# Patient Record
Sex: FEMALE | Race: WHITE
[De-identification: names, ages, dates, MRNs, and addresses within clinical notes are randomized per-mention and may not be internally consistent; named-entity substitution may affect disease eponyms.]

---
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
## 2020-07-29 ENCOUNTER — HOSPITAL ENCOUNTER (EMERGENCY)
Dept: HOSPITAL 61 - ER | Age: 20
Discharge: LEFT BEFORE DISCHARGE | End: 2020-07-29
Payer: COMMERCIAL

## 2020-07-29 DIAGNOSIS — N64.4: Primary | ICD-10-CM

## 2020-07-29 DIAGNOSIS — Z53.21: ICD-10-CM

## 2020-09-19 ENCOUNTER — HOSPITAL ENCOUNTER (EMERGENCY)
Dept: HOSPITAL 61 - ER | Age: 20
Discharge: HOME | End: 2020-09-19
Payer: SELF-PAY

## 2020-09-19 VITALS — WEIGHT: 145.51 LBS | BODY MASS INDEX: 28.57 KG/M2 | HEIGHT: 60 IN

## 2020-09-19 VITALS — SYSTOLIC BLOOD PRESSURE: 136 MMHG | DIASTOLIC BLOOD PRESSURE: 67 MMHG

## 2020-09-19 DIAGNOSIS — F32.9: ICD-10-CM

## 2020-09-19 DIAGNOSIS — Z88.2: ICD-10-CM

## 2020-09-19 DIAGNOSIS — Z88.1: ICD-10-CM

## 2020-09-19 DIAGNOSIS — F12.90: ICD-10-CM

## 2020-09-19 DIAGNOSIS — B96.89: ICD-10-CM

## 2020-09-19 DIAGNOSIS — Z98.890: ICD-10-CM

## 2020-09-19 DIAGNOSIS — F19.90: ICD-10-CM

## 2020-09-19 DIAGNOSIS — Z88.8: ICD-10-CM

## 2020-09-19 DIAGNOSIS — Z91.041: ICD-10-CM

## 2020-09-19 DIAGNOSIS — Z88.0: ICD-10-CM

## 2020-09-19 DIAGNOSIS — F41.9: ICD-10-CM

## 2020-09-19 DIAGNOSIS — F17.200: ICD-10-CM

## 2020-09-19 DIAGNOSIS — N76.0: Primary | ICD-10-CM

## 2020-09-19 PROCEDURE — 87591 N.GONORRHOEAE DNA AMP PROB: CPT

## 2020-09-19 PROCEDURE — 99284 EMERGENCY DEPT VISIT MOD MDM: CPT

## 2020-09-19 PROCEDURE — 87491 CHLMYD TRACH DNA AMP PROBE: CPT

## 2020-09-19 PROCEDURE — 96372 THER/PROPH/DIAG INJ SC/IM: CPT

## 2020-09-19 NOTE — PHYS DOC
Past Medical History


Past Medical History:  Anxiety, Depression, Other


Additional Past Medical Histor:  "Hard to concentrate." ADHD, PTSD, manic 

depression


Past Surgical History:  Other


Additional Past Surgical Histo:  abscess


Smoking Status:  Current Every Day Smoker


Alcohol Use:  None


Drug Use:  Marijuana, Methamphetamine





General Adult


EDM:


Chief Complaint:  SEXUALLY TRANSMITTED DISEASE





HPI:


HPI:





Patient is a 20  year old female who presents to the emergency department for 

testing for sexually transmitted infections.  The patient denies any irregular 

vaginal discharge, odor, or bleeding.  She reports that her boyfriend informed 

her about abnormal penile discharge and dysuria that he was experiencing this 

week.  Patient denies any fever, abdominal pain, back pain, nausea, vomiting, 

diarrhea, dysuria, hematuria, or increased urinary frequency.  She currently 

denies any pain.





Review of Systems:


Review of Systems:


Constitutional:   Denies fever or chills. []


GI:   Denies abdominal pain, nausea, vomiting, or diarrhea. [] 


: See HPI


Integument:   Denies rash. [] 


Complete ROS is negative unless otherwise stated in the HPI.





Heart Score:


Risk Factors:


Risk Factors:  DM, Current or recent (<one month) smoker, HTN, HLP, family 

history of CAD, obesity.


Risk Scores:


Score 0 - 3:  2.5% MACE over next 6 weeks - Discharge Home


Score 4 - 6:  20.3% MACE over next 6 weeks - Admit for Clinical Observation


Score 7 - 10:  72.7% MACE over next 6 weeks - Early Invasive Strategies





Current Medications:





Current Medications








 Medications


  (Trade)  Dose


 Ordered  Sig/[facility]  Start Time


 Stop Time Status Last Admin


Dose Admin


 


 Azithromycin


  (Zithromax)  1,000 mg  1X  ONCE  9/19/20 21:15


 9/19/20 21:16 DC 9/19/20 21:30


1,000 MG


 


 Ceftriaxone Sodium


  (Rocephin Im)  250 mg  1X  ONCE  9/19/20 21:15


 9/19/20 21:16 DC 9/19/20 21:30


250 MG











Allergies:


Allergies:





Allergies








Coded Allergies Type Severity Reaction Last Updated Verified


 


  Iodinated Contrast Media Allergy Intermediate Hives 9/15/16 Yes


 


  Penicillins Allergy Intermediate Vomiting,Hives 9/15/16 Yes


 


  amoxicillin Allergy Intermediate Nausea and Vomiting,Hives 9/15/16 Yes


 


  sulfamethoxazole Allergy Intermediate Vomiting,Hives. 9/15/16 Yes


 


  trimethoprim Allergy Intermediate Vomiting,Hives. 9/15/16 Yes











Physical Exam:


PE:





Constitutional: Well developed, well nourished, no acute distress, non-toxic 

appearance. 


HENT: Normocephalic, atraumatic, bilateral external ears normal, nose normal. 


Eyes: PERRLA, EOMI, conjunctiva normal, no discharge.


Neck: Normal range of motion, no stridor.


Cardiovascular: Heart rate regular rhythm


Lungs & Thorax:  Respirations even and unlabored, no retractions, no respiratory

 distress


Pelvic Exam: Chaperone present


 Abdomen:      Nontender, soft


 External Genitalia:   Normal Skin


 Speculum:      Normal vaginal mucosa, pale yellow cervical discharge, 

nonfriable


 Bimanual:       No adnexal masses or tenderness, No CMT


Skin: Warm, dry, no erythema, no rash. 


Extremities: No cyanosis, ROM intact, no edema. 


Neurologic: Alert and oriented X 3, no focal deficits noted. 


Psychologic: Affect normal, judgement normal, mood normal.





Current Patient Data:


Labs:





Microbiology


9/19/20 Wet Prep - Final, Complete


Vital Signs:





                                   Vital Signs








  Date Time  Temp Pulse Resp B/P (MAP) Pulse Ox O2 Delivery O2 Flow Rate FiO2


 


9/19/20 20:45 98.7 113 15 136/67 (90) 99 Room Air  





 98.7       











EKG:


EKG:


[]





Radiology/Procedures:


Radiology/Procedures:


[]





Course & Med Decision Making:


Course & Med Decision Making


Pertinent Labs and Imaging studies reviewed. (See chart for details)


Patient was treated prophylactically with 250 mg of IM Rocephin, and 1 g of PO 

Zithromax. Patient was instructed to avoid having intercourse until the results 

of gonorrhea and chlamydia testing are available, patient was notified that 

these results would not be available for 48 hours. If one or both of these tests

 is positive, patient needs to refrain from intercourse for approximately 1 week

 following the treatment of any current partners.


Wet mount was concerning for bacterial vaginosis, prescription written for 

Flagyl.





Patient verbalized an understanding of home care, medications, follow-up, and 

return to ED instructions and was in agreement with the plan of care.





[]





Dragon Disclaimer:


Dragon Disclaimer:


This electronic medical record was generated, in whole or in part, using a voice

 recognition dictation system.





Departure


Departure


Impression:  


   Primary Impression:  


   Bacterial vaginosis


   Additional Impression:  


   Contact with and (suspected) exposure to infections with a predominantly 

   sexual mode of transmission


Disposition:  01 HOME, SELF-CARE


Condition:  STABLE


Referrals:  


STAN GONZALEZ (PCP)


Patient Instructions:  Bacterial Vaginosis, Easy-to-Read, Sexually Transmitted 

Disease, Easy-to-Read





Additional Instructions:  


Fill the prescription and use as directed. Recommend that you go to your local 

health department for comprehensive sexually transmitted disease testing. You 

have been treated for a suspected gonorrhea and chlamydia. Avoid having 

intercourse until the results of gonorrhea and chlamydia testing are available, 

these results will not be available for 48 hours. If one or both of these tests 

is positive, you need to refrain from intercourse for approximately 1 week 

following the treatment of any current partners. Follow-up with your primary 

care doctor if symptoms persist, return to ER symptoms worsen.


Scripts


Metronidazole (FLAGYL) 500 Mg Tablet


1 TAB PO BID, #14 TAB 0 Refills


   Prov: SAHRA SHARIF         9/19/20





Justicifation of Admission Dx:


Justifications for Admission:


Justification of Admission Dx:  N/A











SAHRA SHARIF       Sep 19, 2020 21:45

## 2020-11-03 ENCOUNTER — HOSPITAL ENCOUNTER (EMERGENCY)
Dept: HOSPITAL 61 - ER | Age: 20
Discharge: HOME | End: 2020-11-03
Payer: SELF-PAY

## 2020-11-03 VITALS — WEIGHT: 145.28 LBS | BODY MASS INDEX: 28.52 KG/M2 | HEIGHT: 60 IN

## 2020-11-03 VITALS — DIASTOLIC BLOOD PRESSURE: 67 MMHG | SYSTOLIC BLOOD PRESSURE: 118 MMHG

## 2020-11-03 DIAGNOSIS — F90.9: ICD-10-CM

## 2020-11-03 DIAGNOSIS — F32.9: Primary | ICD-10-CM

## 2020-11-03 DIAGNOSIS — Z88.1: ICD-10-CM

## 2020-11-03 DIAGNOSIS — F41.9: ICD-10-CM

## 2020-11-03 DIAGNOSIS — F17.200: ICD-10-CM

## 2020-11-03 DIAGNOSIS — Z88.0: ICD-10-CM

## 2020-11-03 DIAGNOSIS — R20.2: ICD-10-CM

## 2020-11-03 DIAGNOSIS — F19.90: ICD-10-CM

## 2020-11-03 DIAGNOSIS — F43.10: ICD-10-CM

## 2020-11-03 DIAGNOSIS — F12.90: ICD-10-CM

## 2020-11-03 DIAGNOSIS — T40.7X1A: ICD-10-CM

## 2020-11-03 DIAGNOSIS — Z88.8: ICD-10-CM

## 2020-11-03 DIAGNOSIS — Z91.041: ICD-10-CM

## 2020-11-03 DIAGNOSIS — Z88.2: ICD-10-CM
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
## 2020-11-03 DIAGNOSIS — Z98.890: ICD-10-CM

## 2020-11-03 LAB
ACETAMIN: < 2 MCG/ML (ref 10–30)
AMPHETAMINE/METHAMPHETAMINE: (no result)
ANION GAP SERPL CALC-SCNC: 9 MMOL/L (ref 6–14)
BARBITURATES UR-MCNC: (no result) UG/ML
BASOPHILS # BLD AUTO: 0 X10^3/UL (ref 0–0.2)
BASOPHILS NFR BLD: 1 % (ref 0–3)
BENZODIAZ UR-MCNC: (no result) UG/L
BUN SERPL-MCNC: 11 MG/DL (ref 7–20)
CALCIUM SERPL-MCNC: 8.9 MG/DL (ref 8.5–10.1)
CANNABINOIDS UR-MCNC: (no result) UG/L
CHLORIDE SERPL-SCNC: 101 MMOL/L (ref 98–107)
CO2 SERPL-SCNC: 28 MMOL/L (ref 21–32)
COCAINE UR-MCNC: (no result) NG/ML
CREAT SERPL-MCNC: 0.8 MG/DL (ref 0.6–1)
EOSINOPHIL NFR BLD: 0.1 X10^3/UL (ref 0–0.7)
EOSINOPHIL NFR BLD: 1 % (ref 0–3)
ERYTHROCYTE [DISTWIDTH] IN BLOOD BY AUTOMATED COUNT: 16.1 % (ref 11.5–14.5)
ETHANOL SERPL-MCNC: < 10 MG/DL (ref 0–10)
GFR SERPLBLD BASED ON 1.73 SQ M-ARVRAT: 91.4 ML/MIN
GLUCOSE SERPL-MCNC: 120 MG/DL (ref 70–99)
HCT VFR BLD CALC: 40.8 % (ref 36–47)
HGB BLD-MCNC: 13.4 G/DL (ref 12–15.5)
LYMPHOCYTES # BLD: 3.8 X10^3/UL (ref 1–4.8)
LYMPHOCYTES NFR BLD AUTO: 51 % (ref 24–48)
MCH RBC QN AUTO: 27 PG (ref 25–35)
MCHC RBC AUTO-ENTMCNC: 33 G/DL (ref 31–37)
MCV RBC AUTO: 81 FL (ref 79–100)
METHADONE SERPL-MCNC: (no result) NG/ML
MONO #: 0.8 X10^3/UL (ref 0–1.1)
MONOCYTES NFR BLD: 11 % (ref 0–9)
NEUT #: 2.7 X10^3/UL (ref 1.8–7.7)
NEUTROPHILS NFR BLD AUTO: 36 % (ref 31–73)
OPIATES UR-MCNC: (no result) NG/ML
PCP SERPL-MCNC: (no result) MG/DL
PLATELET # BLD AUTO: 236 X10^3/UL (ref 140–400)
POTASSIUM SERPL-SCNC: 3.8 MMOL/L (ref 3.5–5.1)
RBC # BLD AUTO: 5.02 X10^6/UL (ref 3.5–5.4)
SALIC: < 2.8 MG/DL (ref 2.8–20)
SODIUM SERPL-SCNC: 138 MMOL/L (ref 136–145)
WBC # BLD AUTO: 7.4 X10^3/UL (ref 4–11)

## 2020-11-03 PROCEDURE — 96361 HYDRATE IV INFUSION ADD-ON: CPT

## 2020-11-03 PROCEDURE — 81025 URINE PREGNANCY TEST: CPT

## 2020-11-03 PROCEDURE — 80307 DRUG TEST PRSMV CHEM ANLYZR: CPT

## 2020-11-03 PROCEDURE — 36415 COLL VENOUS BLD VENIPUNCTURE: CPT

## 2020-11-03 PROCEDURE — 80329 ANALGESICS NON-OPIOID 1 OR 2: CPT

## 2020-11-03 PROCEDURE — 85025 COMPLETE CBC W/AUTO DIFF WBC: CPT

## 2020-11-03 PROCEDURE — 93005 ELECTROCARDIOGRAM TRACING: CPT

## 2020-11-03 PROCEDURE — 99285 EMERGENCY DEPT VISIT HI MDM: CPT

## 2020-11-03 PROCEDURE — 96376 TX/PRO/DX INJ SAME DRUG ADON: CPT

## 2020-11-03 PROCEDURE — 96374 THER/PROPH/DIAG INJ IV PUSH: CPT

## 2020-11-03 PROCEDURE — 80048 BASIC METABOLIC PNL TOTAL CA: CPT

## 2020-11-03 PROCEDURE — G0480 DRUG TEST DEF 1-7 CLASSES: HCPCS

## 2020-11-03 NOTE — EKG
[facility]

              [address] [place] [address]

Test Date:    2020               Test Time:    12:59:22

Pat Name:     AL PETERSEN             Department:   

Patient ID:   [id number]           Room:          

Gender:       F                        Technician:   

:          2000               Requested By: HUY KIM

Order Number: [id number]           Reading MD:   Christopher Vargas

                                 Measurements

Intervals                              Axis          

Rate:         85                       P:            129

OH:           176                      QRS:          141

QRSD:         88                       T:            146

QT:           354                                    

QTc:          427                                    

                           Interpretive Statements

SINUS RHYTHM

QRS(T) CONTOUR ABNORMALITY

CONSISTENT WITH HIGH LATERAL MYOCARDIAL DAMAGE

ABNORMAL ECG

RI6.02

No previous ECG available for comparison

Electronically Signed On 2020 9:21:48 CST by Christopher Vargas

## 2020-11-03 NOTE — PHYS DOC
Past Medical History


Past Medical History:  Anxiety, Depression, Other


Additional Past Medical Histor:  "Hard to concentrate." ADHD, PTSD, manic 

depression


Past Surgical History:  Other


Additional Past Surgical Histo:  abscess


Smoking Status:  Current Every Day Smoker


Alcohol Use:  None


Drug Use:  Marijuana, Methamphetamine





General Adult


HPI:


HPI:





History obtained from patient and EMS.  Patient is a 20 old female with a 

history of anxiety who presents with chief complaint of overdose.  EMS states 

they were called to the patient's house.  Patient states she smoked marijuana 

earlier today.  She states that she snorted fentanyl just prior to arrival.  

States it was a first time she used it.  She denies any suicidal homicidal 

ideations.  Denies any other drug or alcohol ingestions today.  Denies any pain 

related complaints.  States she does not want her mother to know about her 

usage.  States she was uses recreationally for the first time.  Denies abdominal

pain or chest pain.  Denies any headache.  Denies syncope. No other complaints.





GCS 15.





Review of Systems:


Review of Systems:


Constitutional:   Denies fever or chills. []


Eyes:   Denies change in visual acuity. []


HENT:   Denies nasal congestion or sore throat. [] 


Respiratory:   Denies cough or shortness of breath. [] 


Cardiovascular:   Denies chest pain or edema. [] 


GI:   Denies abdominal pain, nausea, vomiting, bloody stools or diarrhea. [] 


:  Denies dysuria. [] 


Musculoskeletal:   Denies back pain or joint pain. [] 


Integument:   Denies rash. [] 


Neurologic:   Denies headache, focal weakness or sensory changes. [] 


Endocrine:   Denies polyuria or polydipsia. [] 


Lymphatic:  Denies swollen glands. [] 


Psychiatric:  Denies depression or anxiety. []





Heart Score:


Risk Factors:


Risk Factors:  DM, Current or recent (<one month) smoker, HTN, HLP, family 

history of CAD, obesity.


Risk Scores:


Score 0 - 3:  2.5% MACE over next 6 weeks - Discharge Home


Score 4 - 6:  20.3% MACE over next 6 weeks - Admit for Clinical Observation


Score 7 - 10:  72.7% MACE over next 6 weeks - Early Invasive Strategies





Allergies:


Allergies:





Allergies








Coded Allergies Type Severity Reaction Last Updated Verified


 


  Iodinated Contrast Media Allergy Intermediate Hives 9/15/16 Yes


 


  Penicillins Allergy Intermediate Vomiting,Hives 9/15/16 Yes


 


  amoxicillin Allergy Intermediate Nausea and Vomiting,Hives 9/15/16 Yes


 


  sulfamethoxazole Allergy Intermediate Vomiting,Hives. 9/15/16 Yes


 


  trimethoprim Allergy Intermediate Vomiting,Hives. 9/15/16 Yes











Physical Exam:


PE:





Constitutional: Well developed, well nourished, no acute distress, non-toxic 

appearance. []


HENT: Normocephalic, atraumatic, bilateral external ears normal, oropharynx 

moist, no oral exudates, nose normal. []


Eyes: PERRLA, EOMI, conjunctiva normal, no discharge. [] 


Neck: Normal range of motion, no tenderness, supple, no stridor. [] 


Cardiovascular: Tachycardic, regular rhythm, no murmur []


Lungs & Thorax:  Bilateral breath sounds clear to auscultation []


Abdomen: soft, no tenderness, no masses, no pulsatile masses. [] 


Skin: Warm, dry, no erythema, no rash. [] 


Back: No tenderness, no CVA tenderness. [] 


Extremities: No tenderness, no cyanosis, no clubbing, ROM intact, no edema. [] 


Neurologic: Alert with intact cognitive function.  No aphasia, dysarthria, or 

neglect.  GCS 15. Pupils 2 mm briskly reactive b/l. No APD present. Cranial 

nerves 2-12 grossly intact; no facial asymmetry present, tongue midline, 

shoulder shrugging strength intact. Strength 5/5 and symmetric throughout. Light

 touch sensation intact throughout. Cerebellar testing appropriate without 

evidence of dysdiadochokinesia. DTR's 2+ in all 4 extremities.  Negative 

pronator drift bilaterally.  Gait normal


Psychologic: Tearful





Current Patient Data:


Labs:





Laboratory Tests








Test


 11/3/20


12:32 11/3/20


13:10 11/3/20


13:13


 


White Blood Count 7.4 x10^3/uL   


 


Red Blood Count 5.02 x10^6/uL   


 


Hemoglobin 13.4 g/dL   


 


Hematocrit 40.8 %   


 


Mean Corpuscular Volume 81 fL   


 


Mean Corpuscular Hemoglobin 27 pg   


 


Mean Corpuscular Hemoglobin


Concent 33 g/dL 


 


 





 


Red Cell Distribution Width 16.1 %   


 


Platelet Count 236 x10^3/uL   


 


Neutrophils (%) (Auto) 36 %   


 


Lymphocytes (%) (Auto) 51 %   


 


Monocytes (%) (Auto) 11 %   


 


Eosinophils (%) (Auto) 1 %   


 


Basophils (%) (Auto) 1 %   


 


Neutrophils # (Auto) 2.7 x10^3/uL   


 


Lymphocytes # (Auto) 3.8 x10^3/uL   


 


Monocytes # (Auto) 0.8 x10^3/uL   


 


Eosinophils # (Auto) 0.1 x10^3/uL   


 


Basophils # (Auto) 0.0 x10^3/uL   


 


Sodium Level 138 mmol/L   


 


Potassium Level 3.8 mmol/L   


 


Chloride Level 101 mmol/L   


 


Carbon Dioxide Level 28 mmol/L   


 


Anion Gap 9   


 


Blood Urea Nitrogen 11 mg/dL   


 


Creatinine 0.8 mg/dL   


 


Estimated GFR


(Cockcroft-Gault) 91.4 


 


 





 


Glucose Level 120 mg/dL   


 


Calcium Level 8.9 mg/dL   


 


Salicylates Level < 2.8 mg/dL   


 


Salicylate Last Dose Date Unknown   


 


Salicylate Last Dose Time Unknown   


 


Acetaminophen Level < 2 mcg/ml   


 


Acetaminophen Last Dose Date Unknown   


 


Acetaminophen Last Dose Time Uknown   


 


Ethyl Alcohol Level < 10 mg/dL   


 


Urine Opiates Screen  Pos  


 


Urine Methadone Screen  Neg  


 


Urine Barbiturates  Pos  


 


Urine Phencyclidine Screen  Neg  


 


Urine


Amphetamine/Methamphetamine 


 Pos 


 





 


Urine Benzodiazepines Screen  Pos  


 


Urine Cocaine Screen  Neg  


 


Urine Cannabinoids Screen  Pos  


 


Urine Ethyl Alcohol  Neg  


 


Bedside Urine HCG, Qualitative   Hcg negative 








Current Medications








 Medications


  (Trade)  Dose


 Ordered  Sig/Rosemary


 Route


 PRN Reason  Start Time


 Stop Time Status Last Admin


Dose Admin


 


 Sodium Chloride  1,000 ml @ 


 1,000 mls/hr  1X  ONCE


 IV


   11/3/20 12:30


 11/3/20 13:29 DC 11/3/20 12:37





 


 Naloxone HCl


  (Narcan)  0.4 mg  1X  ONCE


 IV


   11/3/20 12:45


 11/3/20 12:46 DC 11/3/20 12:36





 


 Ondansetron HCl


  (Zofran)  4 mg  TID PRN  PRN


 IVP


 nausea  11/3/20 12:45


     





 


 Dicyclomine HCl


  (Bentyl)  20 mg  QIDPRN  PRN


 PO


 abdominal pain  11/3/20 12:45


     





 


 Naloxone HCl


  (Narcan)  0.2 mg  1X  ONCE


 IV


   11/3/20 13:15


 11/3/20 13:16 DC 11/3/20 13:13











Vital Signs:





Vital Signs








  Date Time  Temp Pulse Resp B/P (MAP) Pulse Ox O2 Delivery O2 Flow Rate FiO2


 


11/3/20 13:39  96 16 137/80 (99) 99 Room Air  


 


11/3/20 12:19 99.3 106 14 135/85 (102) 93 Room Air  





 99.3       











EKG:


EKG:


[] EKG consistent with normal sinus rhythm.  Ventricular rate of 85 bpm.  Right 

axis noted.  Intervals normal.  No acute ischemic changes noted.





Radiology/Procedures:


Radiology/Procedures:


[]





Course & Med Decision Making:


Course & Med Decision Making


Pertinent Labs and Imaging studies reviewed. (See chart for details)





[] Patient is a 20-year-old female presents via EMS for unresponsiveness.  

Patient alert and oriented x3 with a GCS of 15 on my initial examination.  

Tearful initially.  She does report snorting crushed fentanyl for the first time

 today.  While in the emergency department the patient did make comments that 

she was feeling depressed and numb all over due to child custody issues.  She 

states this is why she took the medication.  Patient to become somewhat somnole

nt with bradypnea while the emergency department.  She was initially given 0.4 

mg of Narcan with immediate response.  She did have 1 more episode where she 

became somewhat somnolent required additional 0.2 mg of intravenous Narcan.  UDS

 positive for multiple substances.  Patient has remained clinically and 

hemodynamically stable while in the emergency department.  Psychiatric asses

sment team evaluate the patient at bedside.  They do not feel she requires 

inpatient management.  Outpatient resources were established for the patient.  

She does have a safe place to go at home and safety plan with psychiatric team. 

 Patient was monitored and showed no signs of clinical or respiratory 

deterioration.  At this time she is appropriate for discharge home.  Return prec

autions discussed and understood.  Stable for discharge.





Dragon Disclaimer:


Dragon Disclaimer:


This electronic medical record was generated, in whole or in part, using a voice

 recognition dictation system.





Departure


Departure


Impression:  


   Primary Impression:  


   Drug overdose


   Qualified Codes:  T50.901A - Poisoning by unspecified drugs, medicaments and 

   biological substances, accidental (unintentional), initial encounter


   Additional Impression:  


   Depressed


   Qualified Codes:  F32.9 - Major depressive disorder, single episode, 

   unspecified


Disposition:  01 DC HOME SELF CARE/HOMELESS


Condition:  STABLE


Referrals:  


STAN GONZALEZ (PCP)


Patient Instructions:  Depression, Adult





Additional Instructions:  


Please follow-up with your primary care physician in the next 2 to 3 days.











HUY KIM DO           Nov 3, 2020 12:25

## 2020-11-30 ENCOUNTER — HOSPITAL ENCOUNTER (EMERGENCY)
Dept: HOSPITAL 61 - ER | Age: 20
Discharge: HOME | End: 2020-11-30
Payer: SELF-PAY

## 2020-11-30 VITALS — BODY MASS INDEX: 26.84 KG/M2 | WEIGHT: 136.69 LBS | HEIGHT: 60 IN

## 2020-11-30 VITALS — DIASTOLIC BLOOD PRESSURE: 58 MMHG | SYSTOLIC BLOOD PRESSURE: 122 MMHG

## 2020-11-30 DIAGNOSIS — R06.81: ICD-10-CM

## 2020-11-30 DIAGNOSIS — Z51.81: ICD-10-CM
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
## 2020-11-30 DIAGNOSIS — T40.411A: Primary | ICD-10-CM

## 2020-11-30 LAB
ACETAMIN: < 2 MCG/ML (ref 10–30)
AMPHETAMINE/METHAMPHETAMINE: (no result)
ANION GAP SERPL CALC-SCNC: 5 MMOL/L (ref 6–14)
BARBITURATES UR-MCNC: (no result) UG/ML
BASOPHILS # BLD AUTO: 0.1 X10^3/UL (ref 0–0.2)
BASOPHILS NFR BLD: 1 % (ref 0–3)
BENZODIAZ UR-MCNC: (no result) UG/L
BUN SERPL-MCNC: 11 MG/DL (ref 7–20)
CALCIUM SERPL-MCNC: 7.9 MG/DL (ref 8.5–10.1)
CANNABINOIDS UR-MCNC: (no result) UG/L
CHLORIDE SERPL-SCNC: 102 MMOL/L (ref 98–107)
CO2 SERPL-SCNC: 30 MMOL/L (ref 21–32)
COCAINE UR-MCNC: (no result) NG/ML
CREAT SERPL-MCNC: 0.9 MG/DL (ref 0.6–1)
EOSINOPHIL NFR BLD: 0.1 X10^3/UL (ref 0–0.7)
EOSINOPHIL NFR BLD: 1 % (ref 0–3)
ERYTHROCYTE [DISTWIDTH] IN BLOOD BY AUTOMATED COUNT: 15.9 % (ref 11.5–14.5)
ETHANOL SERPL-MCNC: < 10 MG/DL (ref 0–10)
GFR SERPLBLD BASED ON 1.73 SQ M-ARVRAT: 79.8 ML/MIN
GLUCOSE SERPL-MCNC: 173 MG/DL (ref 70–99)
HCT VFR BLD CALC: 37.5 % (ref 36–47)
HGB BLD-MCNC: 12.3 G/DL (ref 12–15.5)
LYMPHOCYTES # BLD: 5.4 X10^3/UL (ref 1–4.8)
LYMPHOCYTES NFR BLD AUTO: 51 % (ref 24–48)
MCH RBC QN AUTO: 27 PG (ref 25–35)
MCHC RBC AUTO-ENTMCNC: 33 G/DL (ref 31–37)
MCV RBC AUTO: 81 FL (ref 79–100)
METHADONE SERPL-MCNC: (no result) NG/ML
MONO #: 1 X10^3/UL (ref 0–1.1)
MONOCYTES NFR BLD: 9 % (ref 0–9)
NEUT #: 4 X10^3/UL (ref 1.8–7.7)
NEUTROPHILS NFR BLD AUTO: 38 % (ref 31–73)
OPIATES UR-MCNC: (no result) NG/ML
PCP SERPL-MCNC: (no result) MG/DL
PLATELET # BLD AUTO: 271 X10^3/UL (ref 140–400)
POTASSIUM SERPL-SCNC: 3.6 MMOL/L (ref 3.5–5.1)
PREG TEST PT QUAL: NEGATIVE
RBC # BLD AUTO: 4.62 X10^6/UL (ref 3.5–5.4)
SALIC: < 2.8 MG/DL (ref 2.8–20)
SODIUM SERPL-SCNC: 137 MMOL/L (ref 136–145)
WBC # BLD AUTO: 10.6 X10^3/UL (ref 4–11)

## 2020-11-30 PROCEDURE — 85025 COMPLETE CBC W/AUTO DIFF WBC: CPT

## 2020-11-30 PROCEDURE — 71045 X-RAY EXAM CHEST 1 VIEW: CPT

## 2020-11-30 PROCEDURE — G0480 DRUG TEST DEF 1-7 CLASSES: HCPCS

## 2020-11-30 PROCEDURE — 96361 HYDRATE IV INFUSION ADD-ON: CPT

## 2020-11-30 PROCEDURE — 80048 BASIC METABOLIC PNL TOTAL CA: CPT

## 2020-11-30 PROCEDURE — 99285 EMERGENCY DEPT VISIT HI MDM: CPT

## 2020-11-30 PROCEDURE — 36415 COLL VENOUS BLD VENIPUNCTURE: CPT

## 2020-11-30 PROCEDURE — 96374 THER/PROPH/DIAG INJ IV PUSH: CPT

## 2020-11-30 PROCEDURE — 80307 DRUG TEST PRSMV CHEM ANLYZR: CPT

## 2020-11-30 PROCEDURE — 80329 ANALGESICS NON-OPIOID 1 OR 2: CPT

## 2020-11-30 PROCEDURE — 84703 CHORIONIC GONADOTROPIN ASSAY: CPT

## 2020-11-30 NOTE — PHYS DOC
General Adult


EDM:


Chief Complaint:  OVERDOSE





HPI:


HPI:


20-year-old female who denies any past medical history presents to the ED, found

unresponsive in her car, GCS3 on arrival, no gag, oral airway placed and 

assisted ventilations with ambu bag. Apnea resolved with narcan 2mg IVP.  

Patient admits to snorting Xanax and fentanyl prior to arrival.  Does report she

has overdosed before.  Has no active complaints in ED and is asking for her mom.





Review of Systems:


Review of Systems:


Constitutional:   Denies fever or chills. []


Eyes:   Denies change in visual acuity. []


HENT:   Denies nasal congestion or sore throat. [] 


Respiratory:   Denies cough or shortness of breath. [] 


Cardiovascular:   Denies chest pain or edema. [] 


GI:   Denies abdominal pain, nausea, vomiting, bloody stools or diarrhea. [] 


:  Denies dysuria. [] 


Musculoskeletal:   Denies back pain or joint pain. [] 


Integument:   Denies rash. [] 


Neurologic:   Denies headache, focal weakness or sensory changes. [] 


Endocrine:   Denies polyuria or polydipsia. [] 


Lymphatic:  Denies swollen glands. [] 


Psychiatric:  Denies depression or anxiety., denies SI/HI





Heart Score:


Risk Factors:


Risk Factors:  DM, Current or recent (<one month) smoker, HTN, HLP, family 

history of CAD, obesity.


Risk Scores:


Score 0 - 3:  2.5% MACE over next 6 weeks - Discharge Home


Score 4 - 6:  20.3% MACE over next 6 weeks - Admit for Clinical Observation


Score 7 - 10:  72.7% MACE over next 6 weeks - Early Invasive Strategies





Allergies:


Allergies:





Allergies








Coded Allergies Type Severity Reaction Last Updated Verified


 


  Penicillins Allergy Unknown  20 Yes











Physical Exam:


PE:





Constitutional: afebrile, in no distress once narcan given


HENT: Normocephalic, atraumatic,


Eyes: EOMI, conjunctiva normal, no discharge.  


Neck: Normal range of motion,  supple, 


Cardiovascular: S1/2 present, regular rhythm


Lungs & Thorax: Speaking in full sentences, bilateral equal chest rise, no 

tachypnea or increased work of breathing


Abdomen:  soft, no tenderness, 


Skin: Warm, dry, no erythema, no rash. [] 


Extremities: No tenderness, no cyanosis, no edema


Neurologic: GCS3, now GCS 15 s/p narcan administration


Psychologic: Affect normal, judgement normal, mood normal. []





EKG:


EKG:


[]





Radiology/Procedures:


Radiology/Procedures:


[]





Course & Med Decision Making:


Course & Med Decision Making


Pertinent Labs and Imaging studies reviewed. (See chart for details)





Patient was educated that she almost  -and to discourage mixing sedatives 

which causes increased risk of apnea and death. Drug screen positive for thc, 

barbs and benzos. Mother at bedside who is very supportive of pt.  Patient 

overdosed on 1 time prior to fentanyl and was in a rehab facility.  Patient 

denies any SI or HI, mother does not feel that patient is a danger to herself.  

Patient reports was an accident.  Glucose was elevated on labs.  Strict ED 

return precautions were given for difficulties breathing, chest pain, shortness 

of breath, suicidal or homicidal ideations. Encouraged urgent outpatient follow-

up with PMD and follow-up with psychiatry/drug rehab.  Life-threatening 

processes were considered but are low suspicion at this time, given history and 

physical exam. Pt was educated on all prescription medications and adverse 

effects.  All patient's questions were answered and pt was stable at time of 

discharge.





Life/limb-threatening differential includes but is not limited to, end organ 

damage/sepsis, trauma/abuse/neglect, neurologic deficit, alcohol/drug ingestion,

 toxidrome, suicidal/homicidal ideations plans or attempts, psychosis or mental 

illness resulting in self neglect and inability to care for self.





I spoken with the patient and her caregivers.  I explained the patient's 

condition, diagnoses and treatment plan based on the information available to me

 at this time.  I have answered the patient and her caregiver's questions and 

addressed any concerns.  The patient and her caregivers have a good 

understanding of patient's diagnosis, condition and treatment plan as can be 

expected at this point.  Vital signs have been stable.  Patient's condition is 

stable and appropriate for discharge from the emergency department. 





Patient will pursue further outpatient evaluation with primary care physician or

 other designated or consulting physician as outlined in the discharge 

instructions.  The patient and/or caregivers are agreeable to this plan of care 

and follow-up instructions have been explained in detail.  The patient and/or 

caregivers have received these instructions in written form and have expressed 

an understanding of the discharge instructions.  The patient and/or caregivers 

are aware that any significant change of condition or worsening of symptoms 

should prompt immediate return to this or the closest emergency department or 

call to 911.





Comfort Disclaimer:


Dragon Disclaimer:


This electronic medical record was generated, in whole or in part, using a voice

 recognition dictation system.





Departure


Departure


Impression:  


   Primary Impression:  


   Overdose


   Additional Impressions:  


   Encounter for monitoring naltrexone therapy


   Apnea


Disposition:  01 DC HOME SELF CARE/HOMELESS


Condition:  STABLE


Referrals:  


BLAYNE MENDENHALL MD


FOLLOW UP WITH FAMILY MEDICINE:





Family Medicine


Address:


[address]


[place] [address]


Phone:


[phone]


Patient Instructions:  Naltrexone injection, Narcotic Overdose





Additional Instructions:  


FOLLOW UP WITH PSYCHIATRY:





Dr. Radu Marcelo


Psychiatry Specialist


[address], [place] [address]


Phone: [phone]





[facility]


[address] [place] [address]


[phone]


24-hour crisis line: [phone]





EMERGENCY DEPARTMENT GENERAL DISCHARGE INSTRUCTIONS





Thank you for coming to [facility] Emergency Department (ED) 

today and 


trusting us with you care.  We trust that you had a positive experience in our 

Emergency 


Department.  If you wish to speak to the department management, you may call the

 Director at 


[phone].





YOUR FOLLOW UP INSTRUCTIONS ARE AS FOLLOWS:





1.  Do you have a private Doctor?  If you do not have a private doctor, please 

ask for a 


resource list of physicians or clinics that may be able to assist you with 

follow up care.





2.  The Emergency Physicain has interpreted your x-rays.  The X-Ray specialist 

will also 


review them.  If there is a change in the findings, you will be notified in 48 

hours when at 


all possible.





3.  A lab test or culture has been done, your results will be reviewed and you 

will be 


notified if you need a change in treatment.





ADDITIONAL INSTRUCTIONS AND INFORMATION:





1.  Your care today has been supervised by a physician who is specially trained 

in emergency 


care.  Many problems require more than one evaluation for a complete diagnosis 

and 


treatment.  We recommend that you schedule your follow up appointment as 

recommended to 


ensure complete treatment of you illness or injury.  If you are unable to obtain

 follow up 


care and continue to have a problem, or if your condition worsens, we recommend 

that you 


return to the ED.





2.  We are not able to safely determine your condition over the phone nor are we

 able to 


give sound medical advice over the phone.  For these safety reasons, if you call

 for medical 


advice we will ask you to come to the ED for further evaluation.





3.  If you have any questions regarding these discharge instructions please call

 the ED at 


[phone].





SAFETY INFORMATION:





In the interest of safety, wellness, and injury prevention; we encourage you to 

wear your 


sealbelt, if you smoke; quite smoking, and we encourage family to use a 

protective helmet 


for bicycling and other sporting events that present an increased risk for head 

injury.





IF YOUR SYMPTOMS WORSEN OR NEW SYMPTOMS DEVELOP, OR YOU HAVE CONCERNS ABOUT YOUR

 CONDITION; 


OR IF YOUR CONDITION WORSENS WHILE YOU ARE WAITING FOR YOUR FOLLOW UP 

APPOINTMENT; EITHER 


CONTACT YOUR PRIMARY CARE DOCTOR, THE PHYSICIAN WHOSE NAME AND NUMBER YOU WERE 

GIVEN, OR 


RETURN TO THE ED IMMEDIATELY.











El Camino HospitalGUILLERMINA DO               2020 20:18

## 2020-11-30 NOTE — RAD
Exam: Chest one view

 

INDICATION: Altered mental status, overdose

 

TECHNIQUE: Frontal view of the chest

 

Comparisons: None

 

FINDINGS:

The cardiomediastinal silhouette and pulmonary vessels are within normal 

limits.

 

The lung and pleural spaces are clear.

 

IMPRESSION:

No acute cardiopulmonary process.

 

Electronically signed by: Jp Gardner MD (11/30/2020 9:10 PM) JULIANE